# Patient Record
Sex: MALE | Race: WHITE | NOT HISPANIC OR LATINO | Employment: STUDENT | URBAN - METROPOLITAN AREA
[De-identification: names, ages, dates, MRNs, and addresses within clinical notes are randomized per-mention and may not be internally consistent; named-entity substitution may affect disease eponyms.]

---
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
## 2024-01-03 ENCOUNTER — APPOINTMENT (OUTPATIENT)
Dept: ORTHOPEDIC SURGERY | Facility: CLINIC | Age: 19
End: 2024-01-03
Payer: COMMERCIAL

## 2024-01-10 ENCOUNTER — OFFICE VISIT (OUTPATIENT)
Dept: ORTHOPEDIC SURGERY | Facility: CLINIC | Age: 19
End: 2024-01-10
Payer: COMMERCIAL

## 2024-01-10 DIAGNOSIS — S83.512D RUPTURE OF ANTERIOR CRUCIATE LIGAMENT OF LEFT KNEE, SUBSEQUENT ENCOUNTER: Primary | ICD-10-CM

## 2024-01-10 PROCEDURE — 99213 OFFICE O/P EST LOW 20 MIN: CPT | Performed by: ORTHOPAEDIC SURGERY

## 2024-01-10 NOTE — PROGRESS NOTES
Chief Complaint: follow up    History: 18 y.o. male s/p left knee acl reconstruction using patella tendon bone may 25 ,2023    Physical Exam: left knee full extension and flexion. Incisions well healed. Still has atrophy of quad muscle. Lachman intact.    Imaging that was personally reviewed: none    Assessment/Plan: 18 y.o. male s/p left knee acl revision reconstruction using patella tendon bone May 25, 2023. He is now 7. 5 months out and is at college. He is doing well but he still needs to work on isolating his quad muscle strength. He can do open chain exercises. Follow up in 6 mo for repeat exam.      ** This office note was dictated using Dragon voice to text software and was not proofread for spelling or grammatical errors **

## 2024-03-25 ENCOUNTER — OFFICE VISIT (OUTPATIENT)
Dept: PEDIATRICS | Facility: CLINIC | Age: 19
End: 2024-03-25
Payer: COMMERCIAL

## 2024-03-25 VITALS — TEMPERATURE: 97.7 F | WEIGHT: 154 LBS | BODY MASS INDEX: 19.98 KG/M2

## 2024-03-25 DIAGNOSIS — B34.9 VIRAL SYNDROME: ICD-10-CM

## 2024-03-25 DIAGNOSIS — R05.3 PERSISTENT COUGH FOR 3 WEEKS OR LONGER: Primary | ICD-10-CM

## 2024-03-25 PROCEDURE — 99213 OFFICE O/P EST LOW 20 MIN: CPT | Performed by: PEDIATRICS

## 2024-03-25 PROCEDURE — 1036F TOBACCO NON-USER: CPT | Performed by: PEDIATRICS

## 2024-03-25 RX ORDER — ALBUTEROL SULFATE 90 UG/1
2 AEROSOL, METERED RESPIRATORY (INHALATION) EVERY 4 HOURS PRN
Qty: 18 G | Refills: 0 | Status: SHIPPED | OUTPATIENT
Start: 2024-03-25 | End: 2025-03-25

## 2024-03-25 RX ORDER — BROMPHENIRAMINE MALEATE, PSEUDOEPHEDRINE HYDROCHLORIDE, AND DEXTROMETHORPHAN HYDROBROMIDE 2; 30; 10 MG/5ML; MG/5ML; MG/5ML
10 SYRUP ORAL 4 TIMES DAILY PRN
Qty: 480 ML | Refills: 2 | Status: SHIPPED | OUTPATIENT
Start: 2024-03-25

## 2024-03-25 RX ORDER — AZITHROMYCIN 250 MG/1
TABLET, FILM COATED ORAL
Qty: 6 TABLET | Refills: 0 | Status: SHIPPED | OUTPATIENT
Start: 2024-03-25 | End: 2024-03-30

## 2024-03-25 NOTE — PROGRESS NOTES
Subjective      Rod Sherman is a 18 y.o. male who presents for Cough (Started 2-3 weeks ago-here with mom/Worsening cough).      3+ weeks of congestion and cough  Wet cough  Cloudy/yellow snot  Day and night, increasing frequency, waking him up  No fever since the beginning,  No sob, headache, sinus pain, v/d, st, ear pain  Not taking any OTC at this point  No hx of wheeze or pna. No fam hx of asthma  Denies smoking/vaping      Treated with 10 days of unknown antbx in Feb for sinus infection at [facility]. Does not think every fully cleared          Review of systems negative unless noted above.    Objective   Temp 36.5 °C (97.7 °F) (Temporal)   Wt 69.9 kg (154 lb)   BMI 19.98 kg/m²   BSA: 1.91 meters squared  Growth percentiles: No height on file for this encounter. 50 %ile (Z= 0.01) based on [facility] (Boys, 2-20 Years) weight-for-age data using vitals from 3/25/2024.   General: Well-developed, well-nourished, alert and oriented, no acute distress  Eyes: Normal sclera, PERRLA, EOMI  ENT:  nasal discharge with sinus tenderness, mildly red throat but not beefy, no petechiae, ears are clear.  Cardiac: Regular rate and rhythm, normal S1/S2, no murmurs.  Pulmonary: Clear to auscultation bilaterally, no work of breathing.  GI: Soft nondistended nontender abdomen without rebound or guarding.  Skin: No rashes  Lymph: No lymphadenopathy      Assessment/Plan   Diagnoses and all orders for this visit:  Persistent cough for 3 weeks or longer  -     azithromycin (Zithromax) 250 mg tablet; Take 2 tablets (500 mg) by mouth once daily for 1 day, THEN 1 tablet (250 mg) once daily for 4 days.  -     albuterol 90 mcg/actuation inhaler; Inhale 2 puffs every 4 hours if needed for wheezing or shortness of breath (cough).  Viral syndrome  -     brompheniramine-pseudoeph-DM 2-30-10 mg/5 mL syrup; Take 10 mL by mouth 4 times a day as needed for congestion or cough.  Lungs clear today but hx suspicious for atypical pna. Will treat with azithro  x 5 days. Bromfed dm prn for cough/congestion. Can also try 2 puffs albuterol q4hrs prn. Call in 3 days if not improved, sooner if worse (sob,wheeze, fever) - consider cxr    Elvia Frost MD

## 2024-05-06 NOTE — OP NOTE
PREOPERATIVE DIAGNOSIS:  Left knee anterior cruciate ligament retear and lateral meniscal tear.    POSTOPERATIVE DIAGNOSIS:  Left knee anterior cruciate ligament retear and lateral meniscal tear.    OPERATION/PROCEDURE:  Left knee arthroscopy, revision anterior cruciate ligament  reconstruction using autogenous patellar tendon bone graft and  All-Inside lateral meniscal repair.     SURGEON:  Bela Palomino MD.    ASSISTANT(S):  Dr. Danny Stokes.    ANESTHESIA:  General.    COMPLICATIONS:  None.    TOURNIQUET TIME:  120 minutes.    INDICATIONS AND SIGNIFICANT HISTORY:  The patient is an 17-year-old otherwise healthy male, who tore his  left knee ACL and lateral meniscus 4 years ago and underwent a left  knee arthroscopy, quadriceps autograft, and lateral meniscal repair.  The surgery was 4 years ago.  He recovered from that and played 3  seasons of lacrosse as well as 3 seasons of soccer, but then on his  last lacrosse practice pivoted funny and felt a pop and had swelling  in his knee.  An MRI revealed that he had a complete tear through the  anterior cruciate ligament.  There were also some concerns about the  meniscus.  We discussed that because he had torn his quad autograft  that we would do a patella bone tendon graft.  We also discussed  about the possibility of staged procedure depending on the size of  the drill tunnels.  We looked critically at our x-rays and MRI and it  revealed that his tunnel originally in the femur and tibia were in  good positions.  We discussed that we likely could do with 1-stage  technique, but we will order bone dowels just in case.  We discussed  the risks, benefits, as well as alternative management.  They elected  to proceed with surgery.     DESCRIPTION OF PROCEDURE:  The patient was taken to the operating room and placed in supine  position on the operating room table.  Induction of general  anesthesia was administered by the anesthesia service.  He was given  Kefzol IV  preoperatively.  A femoral nerve block was administered by  the Anesthesia Service.  SCDs were used for DVT prophylaxis.  A  30-inch pneumatic tourniquet was placed on the left lower extremity,  which was inflated to 250 mmHg for a total of 120 minutes during the  operative procedure after Esmarch examination of the left lower  extremity.  Left lower extremity was scrubbed, prepped, and draped in  usual sterile fashion.  Attention was first directed towards  harvesting the graft.  Incision was created at the inferior pole of  the patella down to the tip of the tibial tubercle down through skin  and subcutaneous tissue.  Dissection was carried down sharply to the  paratenon, which was incised sharply.  We then measured the center of  the patellar tendon, which was a very wide patellar tendon and used a  scalpel to incise 10 mm of patellar tendon medially and laterally,  leaving still a huge amount of patellar tendon to repair.  We incised  the patellar tendon on each side and then went distally to the tibial  tubercle.  We used a scalpel to eva 25 mm of bone of the tibial  tubercle and then went across it transversely at the bottom of the  incision.  An oscillating saw was used to first score it medially and  laterally and then we placed the oscillating saw at 30 degrees  cutting medially and laterally and then lastly transversely releasing  the tibial tubercle bone block.  It was a very nice bone block  attached to the patellar tendon.  We then cut the bone block from the  inferior pole of the patella by measuring 25 mm at the inferior pole  of the patella and then scoring it with oscillating saw medially and  laterally as well as transversely proximally and then using the  oscillating saw at 30 degrees angle to remove a nice bone block.  We  took the bone block to the side of the table and debrided it off any  soft tissue attachments and then ended up trimming some of the bone  to get it down to a size 10 for the  femur and the tibia.  We then  drilled 2 holes with using a 2.0 mm drill guide at each ends of the  bone graft.  A #2 FiberWire was placed through each ends of the bone  and on the tibia bone block, which we used for the femur side.  A #2  FiberWire loop stitch was placed around the ends of the graft and  then through one of the holes in the bone PEG.  We then went ahead  with our arthroscopy.  An anterolateral portal was created underneath  the skin next to the patellar tendon down, which was created using an  11 blade scalpel underneath the skin lateral to the patellar tendon.  The arthroscope was placed through this portal.  The joint was  insufflated with lactated Ringer's.  Attention was directed to the  patellofemoral joint.  There was normal patellofemoral tracking.  The  scope was then introduced down to the medial compartment.  Anteromedial portal was created, and a hook probe was inserted  through the medial compartment.  The medial meniscus was probed and  noted to be intact.  The articular surfaces of the femur and tibia  were normal.  We then visualized the anterior cruciate ligament,  which was torn sitting in the notch.  It looked like a midsubstance  tear of the anterior cruciate ligament.  We then went laterally and  probed the lateral meniscus.  There appeared to be the tear that  looked different than his last tear, which was more in the red-white  zone of the posterior horn of the lateral meniscus.  A rasp was  placed in through the tear and it was debrided.  The skid was then  placed into the posterior horn and a Fast-Fix curved suture anchor  was then placed through the front of the tear, and the anchor was  advanced.  The second end of the anchor was then advanced  horizontally, and the knot was cinched down and cut.  Second one was  placed next to the first one in the posterior horn, which was a  vertical placement.  The meniscus was probed and noted to be intact.  We then placed a third one  in an incomplete tear that was heading  more through the body.  Once the lateral meniscus was fixed and noted  to be intact, we then went ahead with removing the remnants of the  old ACL and doing a notchplasty.  He had a very narrow notch, and we  did a notchplasty to open up the space available for the ACL as well  as debride his old graft down to the tibial attachment site.  We left  a tiny little footprint of the ACL at the tibial attachment.  We then  went ahead and placed the scope into the medial portal and used the  RxRevu 10 mm guide set at the 2 to 3 o'clock position on the lateral  side of the femur.  This was basically in the same place or possibly  a tiny bit lower than his initial portal.  We made a small incision  on the lateral side of the femur, and the guide pin was then drilled  in.  We then switched the scope back to the lateral portal and  visualized our positioning, which looked excellent.  We then over  drilled this with the 5 mm drill, then the reamer was opened up, and  the femoral tunnel was reamed to a depth of 25 mm.  The entire  femoral canal was 36 mm, so we decided we could put 25 mm of graft  within the femoral tunnel.  Once we reamed it, we advanced the drill  and reamer back into the joint and closed down the reamer.  We  evacuated extra bone.  We then detached the drill guide and placed  suture down the femoral tunnel, which was pulled out the medial canal  and then snapped to the side.  We then went ahead with the guide  angle bullet set at 55 degrees posterior medial in front of the PCL.  The guide pin was used to drill to the tip aimer, and then a 10 mm  cannulated drill was drilled over this.  We debrided the tibial  tunnel.  We then placed the passing suture down the tibial tunnel.  The graft was then marked right where the bone-tendon interface was  on the femur side.  The sutures were then placed through the passing  suture and pulled up the tibial tunnel intra-articularly  and into the  femur.  We initially had trouble getting the graft into the femur and  then backed it out slightly and rongeured a little bit more bone off  to make it a little bit more bullet shaped at the tip of it.  We were  able to then pass the graft up the femur so that the bone part was  within the femoral tunnel.  It was a tight fit.  The sutures on the  lateral side of the femur were then tied over a button as suspensory  fixation.  We then fixed the femur as well using a Biosure absorbable  screw by using the Nitinol guidewire within the femur and then  tapping with a 7 mm tap to a depth of 20 degrees and placing a 7 mm x  25 mm bioabsorbable screw over the Nitinol guidewire into the femur.  This allowed there to be 2 different types of fixation on the femur  to hold the graft in place.  We then fixed it distally by first  tensioning the graft with 20 cycles of flexion and extension of the  knee.  The knee was then held at 10 degrees of flexion, and we also  did confirm that there was no impingement of the graft in full  extension.  We then fixed the tibia distally.  There was bone coming  out of the tunnel because he had a very long patella tendon soft  tissue graft.  We had to rongeur a little bit off the end of the bone  off the tibial tunnel.  We then used a 7 mm tap to a depth of 25 mm  and placed an 8 mm x 25 mm in length bioabsorbable screw over the  Nitinol guidewire.  We then rongeured the tip of the bone that was  still protuberant and then tied the sutures into a SwiveLock.  We  used a 4.5 drill and then placed a 4.75 SwiveLock into the bone with  the sutures from the tibial side of the graft as a secondary fixation  method.  Once this was completed, a Lachman's revealed very firm  endpoint.  The knee joint was irrigated.  The reamings from the tibia  drilling were then placed within the patella harvest site as well as  the tibial tubercle harvest site.  We then used 0 Vicryl in  interrupted  fashion, closing the patellar tendon loosely and then  closed the paratenon with 2-0 Vicryl interrupted figure-of-eight  sutures.  The subcutaneous layer was closed with interrupted suture  of 2-0 Vicryl, and the skin was closed with a running 4-0 Monocryl.  Steri-Strips were applied.  The small incision laterally was closed  with 2-0 Vicryl in the subcutaneous layer and 4-0 Vicryl in the skin.   Steri-Strips, Xeroform, fluffs, Webril, and an Ace bandage were  applied to the left lower extremity.  The tourniquet had been  released after 120 minutes.  Injection of 0.25% Marcaine and  Duramorph was injected intra-articularly as well as 10 cc of 0.25%  Marcaine in the skin incisions.  Once the dressing was applied in a  T-scope brace, he was then awakened, extubated, and taken to recovery  room in good stable condition, having tolerated the procedure well  without any complications.  He will be toe-touch weightbearing for 6  weeks.  We will start 0 to 40 degrees range of motion for 2 weeks,  followed by 0 to 60 degrees for 2 weeks, followed by 0 to 90 for 2  weeks.  He will return to clinic in 1 week for a wound check, and we  will give him physical therapy prescriptions.     This was revision ACL reconstruction and a lateral meniscal repair.  The surgery took a lot longer because of it being a revision and it  was technically much more difficult.       Bela Palomino MD    DD:  05/25/2023 15:14:02 EST  DT:  05/26/2023 02:21:42 EST  DICTATION NUMBER:  [id number]  INTERNAL JOB NUMBER:  [id number]    CC:  MAYI Palomino MD, Fax: [phone]        Electronic Signatures:  Bela Palomino) (Signed on 01-Jun-2023 21:37)   Authored  Unsigned, Draft (SYS GENERATED) (Entered on 26-May-2023 02:21)   Entered    Last Updated: 01-Jun-2023 21:37 by Bela Palomino)

## 2024-12-26 ENCOUNTER — APPOINTMENT (OUTPATIENT)
Dept: PEDIATRICS | Facility: CLINIC | Age: 19
End: 2024-12-26
Payer: COMMERCIAL

## 2024-12-26 ENCOUNTER — LAB (OUTPATIENT)
Dept: LAB | Facility: LAB | Age: 19
End: 2024-12-26
Payer: COMMERCIAL

## 2024-12-26 VITALS
BODY MASS INDEX: 19.67 KG/M2 | DIASTOLIC BLOOD PRESSURE: 81 MMHG | HEIGHT: 75 IN | WEIGHT: 158.2 LBS | SYSTOLIC BLOOD PRESSURE: 123 MMHG | HEART RATE: 59 BPM

## 2024-12-26 DIAGNOSIS — Z71.89 COUNSELING ON HEALTH PROMOTION AND DISEASE PREVENTION: ICD-10-CM

## 2024-12-26 DIAGNOSIS — Z30.09 ENCOUNTER FOR OTHER GENERAL COUNSELING OR ADVICE ON CONTRACEPTION: ICD-10-CM

## 2024-12-26 DIAGNOSIS — Z00.00 WELLNESS EXAMINATION: ICD-10-CM

## 2024-12-26 DIAGNOSIS — R53.83 OTHER FATIGUE: ICD-10-CM

## 2024-12-26 DIAGNOSIS — Z00.129 WELL ADOLESCENT VISIT WITHOUT ABNORMAL FINDINGS: ICD-10-CM

## 2024-12-26 DIAGNOSIS — Z71.89 COUNSELING ON SUBSTANCE USE AND ABUSE: ICD-10-CM

## 2024-12-26 DIAGNOSIS — Z00.129 WELL ADOLESCENT VISIT WITHOUT ABNORMAL FINDINGS: Primary | ICD-10-CM

## 2024-12-26 LAB
25(OH)D3 SERPL-MCNC: 31 NG/ML (ref 30–100)
ALBUMIN SERPL BCP-MCNC: 4.9 G/DL (ref 3.4–5)
ALP SERPL-CCNC: 72 U/L (ref 33–120)
ALT SERPL W P-5'-P-CCNC: 16 U/L (ref 10–52)
ANION GAP SERPL CALC-SCNC: 14 MMOL/L (ref 10–20)
AST SERPL W P-5'-P-CCNC: 17 U/L (ref 9–39)
BASOPHILS # BLD AUTO: 0.03 X10*3/UL (ref 0–0.1)
BASOPHILS NFR BLD AUTO: 0.5 %
BILIRUB SERPL-MCNC: 0.7 MG/DL (ref 0–1.2)
BUN SERPL-MCNC: 13 MG/DL (ref 6–23)
CALCIUM SERPL-MCNC: 10.2 MG/DL (ref 8.6–10.6)
CHLORIDE SERPL-SCNC: 102 MMOL/L (ref 98–107)
CHOLEST SERPL-MCNC: 185 MG/DL (ref 0–199)
CHOLESTEROL/HDL RATIO: 3
CO2 SERPL-SCNC: 28 MMOL/L (ref 21–32)
CREAT SERPL-MCNC: 0.96 MG/DL (ref 0.5–1.3)
EGFRCR SERPLBLD CKD-EPI 2021: >90 ML/MIN/1.73M*2
EOSINOPHIL # BLD AUTO: 0.08 X10*3/UL (ref 0–0.7)
EOSINOPHIL NFR BLD AUTO: 1.2 %
ERYTHROCYTE [DISTWIDTH] IN BLOOD BY AUTOMATED COUNT: 12 % (ref 11.5–14.5)
GLUCOSE SERPL-MCNC: 90 MG/DL (ref 74–99)
HCT VFR BLD AUTO: 48.6 % (ref 41–52)
HDLC SERPL-MCNC: 62.7 MG/DL
HGB BLD-MCNC: 16.9 G/DL (ref 13.5–17.5)
IMM GRANULOCYTES # BLD AUTO: 0.02 X10*3/UL (ref 0–0.7)
IMM GRANULOCYTES NFR BLD AUTO: 0.3 % (ref 0–0.9)
IRON SATN MFR SERPL: 39 % (ref 25–45)
IRON SERPL-MCNC: 155 UG/DL (ref 35–150)
LDLC SERPL CALC-MCNC: 93 MG/DL
LYMPHOCYTES # BLD AUTO: 2.08 X10*3/UL (ref 1.2–4.8)
LYMPHOCYTES NFR BLD AUTO: 32.1 %
MCH RBC QN AUTO: 32 PG (ref 26–34)
MCHC RBC AUTO-ENTMCNC: 34.8 G/DL (ref 32–36)
MCV RBC AUTO: 92 FL (ref 80–100)
MONOCYTES # BLD AUTO: 0.56 X10*3/UL (ref 0.1–1)
MONOCYTES NFR BLD AUTO: 8.7 %
NEUTROPHILS # BLD AUTO: 3.7 X10*3/UL (ref 1.2–7.7)
NEUTROPHILS NFR BLD AUTO: 57.2 %
NON HDL CHOLESTEROL: 122 MG/DL (ref 0–119)
NRBC BLD-RTO: 0 /100 WBCS (ref 0–0)
PLATELET # BLD AUTO: 189 X10*3/UL (ref 150–450)
POTASSIUM SERPL-SCNC: 4.4 MMOL/L (ref 3.5–5.3)
PROT SERPL-MCNC: 7.3 G/DL (ref 6.4–8.2)
RBC # BLD AUTO: 5.28 X10*6/UL (ref 4.5–5.9)
SODIUM SERPL-SCNC: 140 MMOL/L (ref 136–145)
TIBC SERPL-MCNC: 401 UG/DL (ref 240–445)
TRIGL SERPL-MCNC: 148 MG/DL (ref 0–114)
UIBC SERPL-MCNC: 246 UG/DL (ref 110–370)
VLDL: 30 MG/DL (ref 0–40)
WBC # BLD AUTO: 6.5 X10*3/UL (ref 4.4–11.3)

## 2024-12-26 PROCEDURE — 83550 IRON BINDING TEST: CPT

## 2024-12-26 PROCEDURE — 85025 COMPLETE CBC W/AUTO DIFF WBC: CPT

## 2024-12-26 PROCEDURE — 86665 EPSTEIN-BARR CAPSID VCA: CPT

## 2024-12-26 PROCEDURE — 86664 EPSTEIN-BARR NUCLEAR ANTIGEN: CPT

## 2024-12-26 PROCEDURE — 90656 IIV3 VACC NO PRSV 0.5 ML IM: CPT | Performed by: NURSE PRACTITIONER

## 2024-12-26 PROCEDURE — 84443 ASSAY THYROID STIM HORMONE: CPT

## 2024-12-26 PROCEDURE — 99395 PREV VISIT EST AGE 18-39: CPT | Performed by: NURSE PRACTITIONER

## 2024-12-26 PROCEDURE — 82306 VITAMIN D 25 HYDROXY: CPT

## 2024-12-26 PROCEDURE — 90472 IMMUNIZATION ADMIN EACH ADD: CPT | Performed by: NURSE PRACTITIONER

## 2024-12-26 PROCEDURE — 90715 TDAP VACCINE 7 YRS/> IM: CPT | Performed by: NURSE PRACTITIONER

## 2024-12-26 PROCEDURE — 86663 EPSTEIN-BARR ANTIBODY: CPT

## 2024-12-26 PROCEDURE — 83540 ASSAY OF IRON: CPT

## 2024-12-26 PROCEDURE — 90471 IMMUNIZATION ADMIN: CPT | Performed by: NURSE PRACTITIONER

## 2024-12-26 PROCEDURE — 80061 LIPID PANEL: CPT

## 2024-12-26 PROCEDURE — 80053 COMPREHEN METABOLIC PANEL: CPT

## 2024-12-26 PROCEDURE — 3008F BODY MASS INDEX DOCD: CPT | Performed by: NURSE PRACTITIONER

## 2024-12-26 NOTE — PROGRESS NOTES
Chief Complaint    19  Yr [facility]        History of Present Illness  Rod is here today for routine health maintenance  General Health: Rod in overall is in good health.   Concerns: fatigue - sleeps fine wakes up still tired  Nutrition: Diet is balanced.   Dental Care: Rod has a dental home. Dental hygiene is regularly performed.   Sleep: Sleep patterns are appropriate.   Behavior/Socialization: Peer relationships are appropriate. Parent-child-sibling interactions are normal. Has a supportive adult relationship.   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     Sex: currently dating.   Drugs (Substance use/abuse): Denies drug use. Denies tobacco use. Occasional alcohol use.   Mental Health: A screening questionnaire for depression was negative. Displays self confidence. Has ways to cope with stress. Does not have thoughts of hurting self or has considered suicide. Does not express concerning mental health symptoms.   Safety: Uses safety belts or equipment. Uses sunscreen. There are smoke detectors in the home. Carbon monoxide detectors are used in the home. Is not exposed to second hand smoke. Firearm(s) are not in the home. Has nonviolent peer relationships. Lives in a nonviolent home. Water safety reviewed and practiced.      Review of Systems  ROS negative for General, Eyes, ENT, Cardiovascular, GI, , Ortho, Derm, Neuro, Psych, Lymph unless noted in the HPI above and/or in the problem list. Denies asthma or cardiac symptoms with and without activity. Denies history of LOC or concussion.     Physical Exam  Constitutional: The patient is a well-developed, well-nourished and in no apparent distress. Alert and oriented x3.  HEENT: Head is normocephalic and atraumatic. Extraocular muscles are intact. Pupils are equal, round, and reactive to light and accommodation. Nares appeared normal. Mouth is  "well hydrated and without lesions. Mucous membranes are moist. Posterior pharynx clear of any exudate or lesions.  Neck: Supple. No carotid bruits. No lymphadenopathy or thyromegaly.  Pulmonary: Clear to auscultation. Good air exchange. No effort in breathing.   Cardiovascular: Regular rate and rhythm. No significant murmur not appreciated. Pulses +2=. Carotid WNL.  Abdomen: Soft, nontender, and nondistended. Positive bowel sounds. No hepatosplenomegaly was noted. Abdominal aorta normal.  External Genitalia: WNL for age and development.  Musculoskeletal: Extremities: Without any cyanosis, clubbing, rash, lesions or edema. 2\" Ortho exam WNL. Good strength = bilaterally. FROM. No scoliosis appreciated.  Neurologic: Cranial nerves II through XII are grossly intact. Reflexes + 2 =.  Psychiatric: WNL affect, appropriate interaction.   Skin: No significant rashes or lesions noted.     Patient Discussion/Summary    Today's discussion topics included, but were not limited to the following:.   Rod growth and development are appropriate for age.   Immunizations: Immunizations are up to date.   Anticipatory Guidance: Adolescent health and safety topics were reviewed.     Teenage Depression Screening: No risks identified.     Impression: Your growth and development is appropriate for age. According to the Body Mass Index (BMI) classification, you are between the 5th and 84th percentile. Health and safety topics were reviewed. Promoted healthy habits through brushing and flossing teeth, visiting a dentist twice a year, limiting TV/screen time , protecting hearing at work, home and concerts, supporting a positive body image, eating a balanced diet and participating in physical activities 60 min daily. Reviewed family time, community involvement and friends/relationships. Discussed dealing with stress, mood changes and sexuality. Topics discussed to support healthy behavior choices included: tobacco,inhalants, alcohol, drugs, " prescription drugs, abstinence and/or safe sex, use of seat belts, gun safety, conflict resolution, sports/recreation safety, sun safety and Internet and social media safety.   Encourage positive age-appropriate and supportive friendships. Encourage open communication with parents, family and friends.     Recommend yearly physicals to promote well-being and healthy living    We discussed oral contraceptive use including the lack of protection from STDs, risks, benefits and alternatives. Remember to use a condom to prevent unwanted pregnancies or abdiaziz an STD.      To further evaluate your health, I have ordered Labs: Complete blood cell count with differential; Complete metabolic panel; fasting lipid panel. Please stop eating at 10 pm the night before the lab draw, then fast until having labs drawn. Actions pending lab results:     Thank you for the opportunity and privilege to provide your medical care. I appreciate your trust and confidence in my ability and experience. Thank you again and I look forward to seeing and working with you in the future. Stay healthy and happy!!

## 2024-12-27 DIAGNOSIS — R53.83 OTHER FATIGUE: Primary | ICD-10-CM

## 2024-12-27 DIAGNOSIS — B27.80 OTHER INFECTIOUS MONONUCLEOSIS WITHOUT COMPLICATION: ICD-10-CM

## 2024-12-27 LAB
EBV EA IGG SER QL: NEGATIVE
EBV NA AB SER QL: POSITIVE
EBV VCA IGG SER IA-ACNC: POSITIVE
EBV VCA IGM SER IA-ACNC: NEGATIVE
TSH SERPL-ACNC: 1.65 MIU/L (ref 0.44–3.98)

## 2025-07-08 DIAGNOSIS — G93.31 POSTVIRAL FATIGUE SYNDROME: Primary | ICD-10-CM

## 2025-07-26 LAB
ALBUMIN SERPL-MCNC: 5.1 G/DL (ref 3.6–5.1)
ALP SERPL-CCNC: 84 U/L (ref 36–130)
ALT SERPL-CCNC: 18 U/L (ref 9–46)
ANION GAP SERPL CALCULATED.4IONS-SCNC: 12 MMOL/L (CALC) (ref 7–17)
AST SERPL-CCNC: 18 U/L (ref 10–40)
BASOPHILS # BLD AUTO: 18 CELLS/UL (ref 0–200)
BASOPHILS NFR BLD AUTO: 0.3 %
BILIRUB SERPL-MCNC: 1.4 MG/DL (ref 0.2–1.2)
BUN SERPL-MCNC: 11 MG/DL (ref 7–25)
CALCIUM SERPL-MCNC: 10.2 MG/DL (ref 8.6–10.3)
CHLORIDE SERPL-SCNC: 105 MMOL/L (ref 98–110)
CO2 SERPL-SCNC: 23 MMOL/L (ref 20–32)
CREAT SERPL-MCNC: 1.05 MG/DL (ref 0.6–1.24)
CRP SERPL-MCNC: <3 MG/L
EGFRCR SERPLBLD CKD-EPI 2021: 104 ML/MIN/1.73M2
EOSINOPHIL # BLD AUTO: 61 CELLS/UL (ref 15–500)
EOSINOPHIL NFR BLD AUTO: 1 %
ERYTHROCYTE [DISTWIDTH] IN BLOOD BY AUTOMATED COUNT: 11.9 % (ref 11–15)
EST. AVERAGE GLUCOSE BLD GHB EST-MCNC: 85 MG/DL
EST. AVERAGE GLUCOSE BLD GHB EST-SCNC: 4.7 MMOL/L
FERRITIN SERPL-MCNC: 99 NG/ML (ref 38–380)
GLUCOSE SERPL-MCNC: 91 MG/DL (ref 65–99)
HBA1C MFR BLD: 4.6 %
HCT VFR BLD AUTO: 51.9 % (ref 38.5–50)
HGB BLD-MCNC: 17.6 G/DL (ref 13.2–17.1)
IRON SATN MFR SERPL: 49 % (CALC) (ref 20–48)
IRON SERPL-MCNC: 184 MCG/DL (ref 50–195)
LYMPHOCYTES # BLD AUTO: 2147 CELLS/UL (ref 850–3900)
LYMPHOCYTES NFR BLD AUTO: 35.2 %
MCH RBC QN AUTO: 32.7 PG (ref 27–33)
MCHC RBC AUTO-ENTMCNC: 33.9 G/DL (ref 32–36)
MCV RBC AUTO: 96.5 FL (ref 80–100)
MONOCYTES # BLD AUTO: 622 CELLS/UL (ref 200–950)
MONOCYTES NFR BLD AUTO: 10.2 %
NEUTROPHILS # BLD AUTO: 3251 CELLS/UL (ref 1500–7800)
NEUTROPHILS NFR BLD AUTO: 53.3 %
PLATELET # BLD AUTO: 190 THOUSAND/UL (ref 140–400)
PMV BLD REES-ECKER: 11.1 FL (ref 7.5–12.5)
POTASSIUM SERPL-SCNC: 4.2 MMOL/L (ref 3.5–5.3)
PROT SERPL-MCNC: 7.6 G/DL (ref 6.1–8.1)
RBC # BLD AUTO: 5.38 MILLION/UL (ref 4.2–5.8)
SODIUM SERPL-SCNC: 140 MMOL/L (ref 135–146)
TESTOST FREE SERPL-MCNC: 122.5 PG/ML (ref 35–155)
TESTOST SERPL-MCNC: 901 NG/DL (ref 250–1100)
TIBC SERPL-MCNC: 378 MCG/DL (CALC) (ref 250–425)
VIT B12 SERPL-MCNC: 522 PG/ML (ref 200–1100)
WBC # BLD AUTO: 6.1 THOUSAND/UL (ref 3.8–10.8)

## 2025-07-28 ENCOUNTER — RESULTS FOLLOW-UP (OUTPATIENT)
Dept: PEDIATRICS | Facility: CLINIC | Age: 20
End: 2025-07-28
Payer: COMMERCIAL